# Patient Record
Sex: FEMALE | Race: WHITE | Employment: PART TIME | URBAN - METROPOLITAN AREA
[De-identification: names, ages, dates, MRNs, and addresses within clinical notes are randomized per-mention and may not be internally consistent; named-entity substitution may affect disease eponyms.]

---

## 2024-07-01 ENCOUNTER — OFFICE VISIT (OUTPATIENT)
Age: 29
End: 2024-07-01

## 2024-07-01 VITALS
RESPIRATION RATE: 18 BRPM | SYSTOLIC BLOOD PRESSURE: 118 MMHG | OXYGEN SATURATION: 98 % | HEART RATE: 85 BPM | WEIGHT: 144 LBS | DIASTOLIC BLOOD PRESSURE: 73 MMHG | TEMPERATURE: 98.4 F

## 2024-07-01 DIAGNOSIS — R21 RASH OF GENITAL AREA: ICD-10-CM

## 2024-07-01 DIAGNOSIS — B99.9 INFECTIOUS FOLLICULITIS: Primary | ICD-10-CM

## 2024-07-01 DIAGNOSIS — L73.8 INFECTIOUS FOLLICULITIS: Primary | ICD-10-CM

## 2024-07-01 RX ORDER — TRAZODONE HYDROCHLORIDE 50 MG/1
TABLET ORAL
COMMUNITY
Start: 2022-08-11

## 2024-07-01 RX ORDER — LAMOTRIGINE 150 MG/1
300 TABLET ORAL DAILY
COMMUNITY
Start: 2024-04-26

## 2024-07-01 RX ORDER — CEPHALEXIN 500 MG/1
500 CAPSULE ORAL 4 TIMES DAILY
Qty: 28 CAPSULE | Refills: 0 | Status: SHIPPED | OUTPATIENT
Start: 2024-07-01 | End: 2024-07-08

## 2024-07-01 RX ORDER — ARIPIPRAZOLE 5 MG/1
5 TABLET ORAL DAILY
COMMUNITY
Start: 2024-04-26

## 2024-07-01 RX ORDER — BUPROPION HYDROCHLORIDE 300 MG/1
300 TABLET ORAL EVERY MORNING
COMMUNITY
Start: 2022-06-16

## 2024-07-01 NOTE — PATIENT INSTRUCTIONS
Patient was seen today for ongoing vaginal discomfort and rash  Will send vaginal swab to test for BV, yeast and STIs  We will contact you in 2 to 3 days with results  The rash to the vaginal area has already been tested for herpes and was negative  I suspect an infectious folliculitis, molluscum is also a differential  I would like to treat you with Keflex 4 times daily for 7 days  If symptoms do not improve I do recommend following up with a dermatologist, referral provided

## 2024-07-01 NOTE — PROGRESS NOTES
and dry.   Neurological:      General: No focal deficit present.      Mental Status: She is alert and oriented to person, place, and time.               An electronic signature was used to authenticate this note.    RICARDO Juarez NP

## 2024-07-04 LAB
A VAGINAE DNA VAG QL NAA+PROBE: NORMAL SCORE
BVAB2 DNA VAG QL NAA+PROBE: NORMAL SCORE
C ALBICANS DNA VAG QL NAA+PROBE: NEGATIVE
C GLABRATA DNA VAG QL NAA+PROBE: NEGATIVE
C TRACH RRNA SPEC QL NAA+PROBE: NEGATIVE
MEGA1 DNA VAG QL NAA+PROBE: NORMAL SCORE
N GONORRHOEA RRNA SPEC QL NAA+PROBE: NEGATIVE
SPECIMEN SOURCE: NORMAL
T VAGINALIS RRNA SPEC QL NAA+PROBE: NEGATIVE

## 2024-07-09 ENCOUNTER — OFFICE VISIT (OUTPATIENT)
Age: 29
End: 2024-07-09

## 2024-07-09 VITALS
TEMPERATURE: 98.1 F | DIASTOLIC BLOOD PRESSURE: 72 MMHG | HEART RATE: 70 BPM | OXYGEN SATURATION: 100 % | RESPIRATION RATE: 18 BRPM | SYSTOLIC BLOOD PRESSURE: 109 MMHG | WEIGHT: 144 LBS

## 2024-07-09 DIAGNOSIS — R05.1 ACUTE COUGH: ICD-10-CM

## 2024-07-09 DIAGNOSIS — J06.9 VIRAL URI: Primary | ICD-10-CM

## 2024-07-09 DIAGNOSIS — R11.0 NAUSEA: ICD-10-CM

## 2024-07-09 LAB
INFLUENZA A ANTIGEN, POC: NEGATIVE
INFLUENZA B ANTIGEN, POC: NEGATIVE
Lab: NORMAL
PERFORMING INSTRUMENT: NORMAL
QC PASS/FAIL: NORMAL
SARS-COV-2, POC: NORMAL
STREP PYOGENES DNA, POC: NEGATIVE
VALID INTERNAL CONTROL, POC: YES

## 2024-07-09 RX ORDER — NICOTINE 14MG/24HR
1 PATCH, TRANSDERMAL 24 HOURS TRANSDERMAL 2 TIMES DAILY
Qty: 28 CAPSULE | Refills: 0 | Status: SHIPPED | OUTPATIENT
Start: 2024-07-09 | End: 2024-07-23

## 2024-07-09 ASSESSMENT — ENCOUNTER SYMPTOMS: COUGH: 1

## 2024-07-09 NOTE — PROGRESS NOTES
Elvira Nunes (:  1995) is a 28 y.o. female,Established patient, here for evaluation of the following chief complaint(s):  Cough (Wants covid, flu and strep testing. Sx consist of sore throat and fatigue. Sx 4 days.)      Assessment & Plan :  Visit Diagnoses and Associated Orders       Viral URI    -  Primary         Acute cough        POCT COVID-19, Antigen [GNL513 Custom]      AMB POC STREP GO A DIRECT, DNA PROBE [93192 CPT(R)]      POCT Influenza A/B Antigen [08310 Custom]           Nausea                   Patient is seen today for symptoms which are consistent with a viral upper respiratory tract infection  COVID, strep and flu are all negative today  Symptoms only ongoing for about 5 days now, it is a little bit early to be testing for mono  Your vital signs are all stable, physical exam is benign, I am encouraged that symptoms seem somewhat improved today  I would like for you to continue to treat symptomatically  Tylenol/ibuprofen as needed for fevers chills aches and pains  Mucinex-DM as needed for congestion and cough  Lots of fluids, plenty of rest  Follow-up if symptoms persist or worsen       Subjective :    Cough         28 y.o. female presents with symptoms of fatigue and sore throat for the past 4 to 5 days.  She denies any fevers or chills.  Denies any ear pain.  Reports mild nasal congestion and postnasal drip with some mild coughing.  Denies significant sputum production, no shortness of breath or wheezing.  No chest or abdominal pain.  She is currently taking some antibiotics, cephalexin for an infectious folliculitis.  Notes that this is making her a little nauseous and upsetting her stomach at times.  No vomiting, tolerating food and fluids well.  She would like to be tested for COVID strep and flu today.  She denies any known exposures to these illnesses.  She does feel like symptoms are somewhat improved today over how she has felt the past few days.         Vitals:    24

## 2024-07-09 NOTE — PATIENT INSTRUCTIONS
Patient is seen today for symptoms which are consistent with a viral upper respiratory tract infection  COVID, strep and flu are all negative today  Symptoms only ongoing for about 5 days now, it is a little bit early to be testing for mono  Your vital signs are all stable, physical exam is benign, I am encouraged that symptoms seem somewhat improved today  I would like for you to continue to treat symptomatically  Tylenol/ibuprofen as needed for fevers chills aches and pains  Mucinex-DM as needed for congestion and cough  Lots of fluids, plenty of rest  Follow-up if symptoms persist or worsen

## 2024-07-30 ENCOUNTER — OFFICE VISIT (OUTPATIENT)
Age: 29
End: 2024-07-30

## 2024-07-30 VITALS
DIASTOLIC BLOOD PRESSURE: 78 MMHG | RESPIRATION RATE: 17 BRPM | HEART RATE: 68 BPM | SYSTOLIC BLOOD PRESSURE: 122 MMHG | OXYGEN SATURATION: 98 % | TEMPERATURE: 98.8 F | WEIGHT: 139 LBS

## 2024-07-30 DIAGNOSIS — N39.0 ACUTE UTI: Primary | ICD-10-CM

## 2024-07-30 DIAGNOSIS — R35.0 URINARY FREQUENCY: ICD-10-CM

## 2024-07-30 LAB
BILIRUBIN, URINE, POC: NEGATIVE
BLOOD URINE, POC: ABNORMAL
GLUCOSE URINE, POC: NEGATIVE
KETONES, URINE, POC: NEGATIVE
LEUKOCYTE ESTERASE, URINE, POC: NEGATIVE
NITRITE, URINE, POC: NEGATIVE
PH, URINE, POC: 7 (ref 4.6–8)
PROTEIN,URINE, POC: NEGATIVE
SPECIFIC GRAVITY, URINE, POC: 1.01 (ref 1–1.03)
URINALYSIS CLARITY, POC: CLEAR
URINALYSIS COLOR, POC: ABNORMAL
UROBILINOGEN, POC: ABNORMAL

## 2024-07-30 RX ORDER — NICOTINE 14MG/24HR
1 PATCH, TRANSDERMAL 24 HOURS TRANSDERMAL DAILY
Qty: 30 CAPSULE | Refills: 0 | Status: SHIPPED | OUTPATIENT
Start: 2024-07-30 | End: 2024-08-29

## 2024-07-30 RX ORDER — NITROFURANTOIN 25; 75 MG/1; MG/1
100 CAPSULE ORAL 2 TIMES DAILY
Qty: 14 CAPSULE | Refills: 0 | Status: SHIPPED | OUTPATIENT
Start: 2024-07-30 | End: 2024-08-06

## 2024-07-30 NOTE — PROGRESS NOTES
Sitting   Cuff Size: Large Adult   Pulse: 68   Resp: 17   Temp: 98.8 °F (37.1 °C)   SpO2: 98%   Weight: 63 kg (139 lb)       Results for orders placed or performed in visit on 07/30/24   AMB POC URINALYSIS DIP STICK AUTO W/O MICRO   Result Value Ref Range    Color, Urine, POC Light Yellow     Clarity, Urine, POC Clear     Glucose, Urine, POC Negative     Bilirubin, Urine, POC Negative     Ketones, Urine, POC Negative     Specific Gravity, Urine, POC 1.015 1.001 - 1.035    Blood, Urine, POC Trace-lysed     pH, Urine, POC 7.0 4.6 - 8.0    Protein, Urine, POC Negative     Urobilinogen, POC 0.2 mg/dL     Nitrite, Urine, POC Negative     Leukocyte Esterase, Urine, POC Negative           Objective   Physical Exam  Vitals and nursing note reviewed.   Constitutional:       General: She is not in acute distress.     Appearance: Normal appearance. She is not ill-appearing.   HENT:      Head: Normocephalic and atraumatic.   Cardiovascular:      Rate and Rhythm: Normal rate and regular rhythm.      Pulses: Normal pulses.      Heart sounds: Normal heart sounds. No murmur heard.     No friction rub. No gallop.   Pulmonary:      Effort: Pulmonary effort is normal. No respiratory distress.      Breath sounds: Normal breath sounds. No wheezing, rhonchi or rales.   Abdominal:      General: Abdomen is flat. There is no distension.      Palpations: Abdomen is soft. There is no mass.      Tenderness: There is no abdominal tenderness. There is no right CVA tenderness, left CVA tenderness or guarding.   Skin:     General: Skin is warm and dry.   Neurological:      General: No focal deficit present.      Mental Status: She is alert and oriented to person, place, and time.               An electronic signature was used to authenticate this note.    RICARDO Juarez NP

## 2024-08-01 LAB
BACTERIA SPEC CULT: NORMAL
SERVICE CMNT-IMP: NORMAL

## 2024-08-20 SDOH — HEALTH STABILITY: PHYSICAL HEALTH: ON AVERAGE, HOW MANY DAYS PER WEEK DO YOU ENGAGE IN MODERATE TO STRENUOUS EXERCISE (LIKE A BRISK WALK)?: 2 DAYS

## 2024-08-20 SDOH — HEALTH STABILITY: PHYSICAL HEALTH: ON AVERAGE, HOW MANY MINUTES DO YOU ENGAGE IN EXERCISE AT THIS LEVEL?: 60 MIN

## 2024-08-22 ENCOUNTER — OFFICE VISIT (OUTPATIENT)
Facility: CLINIC | Age: 29
End: 2024-08-22
Payer: MEDICAID

## 2024-08-22 VITALS
HEART RATE: 86 BPM | SYSTOLIC BLOOD PRESSURE: 119 MMHG | RESPIRATION RATE: 16 BRPM | HEIGHT: 67 IN | BODY MASS INDEX: 21.66 KG/M2 | WEIGHT: 138 LBS | DIASTOLIC BLOOD PRESSURE: 82 MMHG | OXYGEN SATURATION: 99 %

## 2024-08-22 DIAGNOSIS — Z11.59 NEED FOR HEPATITIS C SCREENING TEST: ICD-10-CM

## 2024-08-22 DIAGNOSIS — Z76.89 ENCOUNTER TO ESTABLISH CARE: Primary | ICD-10-CM

## 2024-08-22 DIAGNOSIS — Z83.49 FAMILY HISTORY OF MTHFR DEFICIENCY: ICD-10-CM

## 2024-08-22 DIAGNOSIS — B94.8 POST-LYME DISEASE SYNDROME: ICD-10-CM

## 2024-08-22 DIAGNOSIS — R23.8 OTHER SKIN CHANGES: ICD-10-CM

## 2024-08-22 DIAGNOSIS — M25.50 MULTIPLE JOINT PAIN: ICD-10-CM

## 2024-08-22 DIAGNOSIS — R53.82 CHRONIC FATIGUE: ICD-10-CM

## 2024-08-22 DIAGNOSIS — E55.9 VITAMIN D DEFICIENCY: ICD-10-CM

## 2024-08-22 DIAGNOSIS — Z82.49 FAMILY HISTORY OF HEART DISEASE: ICD-10-CM

## 2024-08-22 DIAGNOSIS — Z11.4 ENCOUNTER FOR SCREENING FOR HIV: ICD-10-CM

## 2024-08-22 LAB
APPEARANCE UR: CLEAR
BILIRUB UR QL: NEGATIVE
COLOR UR: NORMAL
GLUCOSE UR STRIP.AUTO-MCNC: NEGATIVE MG/DL
HGB UR QL STRIP: NEGATIVE
KETONES UR QL STRIP.AUTO: NEGATIVE MG/DL
LEUKOCYTE ESTERASE UR QL STRIP.AUTO: NEGATIVE
NITRITE UR QL STRIP.AUTO: NEGATIVE
PH UR STRIP: 6 (ref 5–8)
PROT UR STRIP-MCNC: NEGATIVE MG/DL
SP GR UR REFRACTOMETRY: 1.01 (ref 1–1.03)
UROBILINOGEN UR QL STRIP.AUTO: 1 EU/DL (ref 0.2–1)

## 2024-08-22 PROCEDURE — 99204 OFFICE O/P NEW MOD 45 MIN: CPT

## 2024-08-22 SDOH — ECONOMIC STABILITY: FOOD INSECURITY: WITHIN THE PAST 12 MONTHS, YOU WORRIED THAT YOUR FOOD WOULD RUN OUT BEFORE YOU GOT MONEY TO BUY MORE.: NEVER TRUE

## 2024-08-22 SDOH — ECONOMIC STABILITY: FOOD INSECURITY: WITHIN THE PAST 12 MONTHS, THE FOOD YOU BOUGHT JUST DIDN'T LAST AND YOU DIDN'T HAVE MONEY TO GET MORE.: NEVER TRUE

## 2024-08-22 SDOH — ECONOMIC STABILITY: INCOME INSECURITY: HOW HARD IS IT FOR YOU TO PAY FOR THE VERY BASICS LIKE FOOD, HOUSING, MEDICAL CARE, AND HEATING?: NOT HARD AT ALL

## 2024-08-22 ASSESSMENT — PATIENT HEALTH QUESTIONNAIRE - PHQ9
SUM OF ALL RESPONSES TO PHQ QUESTIONS 1-9: 0
SUM OF ALL RESPONSES TO PHQ9 QUESTIONS 1 & 2: 0
SUM OF ALL RESPONSES TO PHQ QUESTIONS 1-9: 0
SUM OF ALL RESPONSES TO PHQ QUESTIONS 1-9: 0
2. FEELING DOWN, DEPRESSED OR HOPELESS: NOT AT ALL
1. LITTLE INTEREST OR PLEASURE IN DOING THINGS: NOT AT ALL
SUM OF ALL RESPONSES TO PHQ QUESTIONS 1-9: 0

## 2024-08-23 ENCOUNTER — TELEPHONE (OUTPATIENT)
Age: 29
End: 2024-08-23

## 2024-08-23 LAB
25(OH)D3 SERPL-MCNC: 38.8 NG/ML (ref 30–100)
ALBUMIN SERPL-MCNC: 4.4 G/DL (ref 3.5–5)
ALBUMIN/GLOB SERPL: 1.5 (ref 1.1–2.2)
ALP SERPL-CCNC: 77 U/L (ref 45–117)
ALT SERPL-CCNC: 28 U/L (ref 12–78)
ANION GAP SERPL CALC-SCNC: 4 MMOL/L (ref 5–15)
AST SERPL-CCNC: 16 U/L (ref 15–37)
BASOPHILS # BLD: 0 K/UL (ref 0–0.1)
BASOPHILS NFR BLD: 1 % (ref 0–1)
BILIRUB SERPL-MCNC: 0.2 MG/DL (ref 0.2–1)
BUN SERPL-MCNC: 7 MG/DL (ref 6–20)
BUN/CREAT SERPL: 7 (ref 12–20)
CALCIUM SERPL-MCNC: 9.6 MG/DL (ref 8.5–10.1)
CHLORIDE SERPL-SCNC: 105 MMOL/L (ref 97–108)
CHOLEST SERPL-MCNC: 233 MG/DL
CO2 SERPL-SCNC: 30 MMOL/L (ref 21–32)
CREAT SERPL-MCNC: 0.98 MG/DL (ref 0.55–1.02)
DIFFERENTIAL METHOD BLD: NORMAL
EOSINOPHIL # BLD: 0.1 K/UL (ref 0–0.4)
EOSINOPHIL NFR BLD: 2 % (ref 0–7)
ERYTHROCYTE [DISTWIDTH] IN BLOOD BY AUTOMATED COUNT: 12.1 % (ref 11.5–14.5)
ERYTHROCYTE [SEDIMENTATION RATE] IN BLOOD: 2 MM/HR (ref 0–20)
GLOBULIN SER CALC-MCNC: 3 G/DL (ref 2–4)
GLUCOSE SERPL-MCNC: 92 MG/DL (ref 65–100)
HCT VFR BLD AUTO: 43.6 % (ref 35–47)
HCV AB SER IA-ACNC: 0.06 INDEX
HCV AB SERPL QL IA: NONREACTIVE
HDLC SERPL-MCNC: 42 MG/DL
HDLC SERPL: 5.5 (ref 0–5)
HGB BLD-MCNC: 14.5 G/DL (ref 11.5–16)
HIV 1+2 AB+HIV1 P24 AG SERPL QL IA: NONREACTIVE
HIV 1/2 RESULT COMMENT: NORMAL
IMM GRANULOCYTES # BLD AUTO: 0 K/UL (ref 0–0.04)
IMM GRANULOCYTES NFR BLD AUTO: 0 % (ref 0–0.5)
LDLC SERPL CALC-MCNC: 162.6 MG/DL (ref 0–100)
LYMPHOCYTES # BLD: 1.2 K/UL (ref 0.8–3.5)
LYMPHOCYTES NFR BLD: 27 % (ref 12–49)
MCH RBC QN AUTO: 31.2 PG (ref 26–34)
MCHC RBC AUTO-ENTMCNC: 33.3 G/DL (ref 30–36.5)
MCV RBC AUTO: 93.8 FL (ref 80–99)
MONOCYTES # BLD: 0.4 K/UL (ref 0–1)
MONOCYTES NFR BLD: 9 % (ref 5–13)
NEUTS SEG # BLD: 2.7 K/UL (ref 1.8–8)
NEUTS SEG NFR BLD: 61 % (ref 32–75)
NRBC # BLD: 0 K/UL (ref 0–0.01)
NRBC BLD-RTO: 0 PER 100 WBC
PLATELET # BLD AUTO: 196 K/UL (ref 150–400)
PMV BLD AUTO: 12.7 FL (ref 8.9–12.9)
POTASSIUM SERPL-SCNC: 4.3 MMOL/L (ref 3.5–5.1)
PROT SERPL-MCNC: 7.4 G/DL (ref 6.4–8.2)
RBC # BLD AUTO: 4.65 M/UL (ref 3.8–5.2)
SODIUM SERPL-SCNC: 139 MMOL/L (ref 136–145)
TRIGL SERPL-MCNC: 142 MG/DL
TSH SERPL DL<=0.05 MIU/L-ACNC: 4.2 UIU/ML (ref 0.45–4.5)
VLDLC SERPL CALC-MCNC: 28.4 MG/DL
WBC # BLD AUTO: 4.5 K/UL (ref 3.6–11)

## 2024-08-23 NOTE — TELEPHONE ENCOUNTER
----- Message from RICARDO Mcnally NP sent at 8/23/2024  8:52 AM EDT -----  I'm going to wait for labs to come back before I give you an answer.  ----- Message -----  From: William Ely  Sent: 8/22/2024   1:55 PM EDT  To: RICARDO Altamirano NP    PT referred and requesting appt

## 2024-08-23 NOTE — RESULT ENCOUNTER NOTE
I have reviewed all lab results which are normal or stable mostly except for lipids. Please advise patient to start mediterranean diet, exercise at least 150 mins per week and follow up as scheduled. Thank you!

## 2024-08-23 NOTE — TELEPHONE ENCOUNTER
Called PT to inform that NP is waiting on lab results before scheduling. LVM requesting call back and provided office number

## 2024-08-24 LAB
CENTROMERE B AB SER-ACNC: <0.2 AI (ref 0–0.9)
CHROMATIN AB SERPL-ACNC: <0.2 AI (ref 0–0.9)
DSDNA AB SER-ACNC: 1 IU/ML (ref 0–9)
ENA JO1 AB SER-ACNC: <0.2 AI (ref 0–0.9)
ENA RNP AB SER-ACNC: 0.3 AI (ref 0–0.9)
ENA SCL70 AB SER-ACNC: <0.2 AI (ref 0–0.9)
ENA SM AB SER-ACNC: <0.2 AI (ref 0–0.9)
ENA SS-A AB SER-ACNC: <0.2 AI (ref 0–0.9)
ENA SS-B AB SER-ACNC: <0.2 AI (ref 0–0.9)
Lab: NORMAL

## 2024-08-27 ENCOUNTER — TELEPHONE (OUTPATIENT)
Age: 29
End: 2024-08-27

## 2024-08-27 LAB
CCP IGA+IGG SERPL IA-ACNC: 6 UNITS (ref 0–19)
RHEUMATOID FACT SERPL-ACNC: <10 IU/ML

## 2024-08-27 NOTE — TELEPHONE ENCOUNTER
----- Message from RICARDO Mcnally NP sent at 8/27/2024  9:04 AM EDT -----  Thank you for contacting [facility]. We appreciate your trust in us for providing care. To ensure access for appropriate referrals, best utilize limited resources, and ensure urgent issues can be seen promptly, the [facility] is reviewing and triaging referrals. Currently, we are not able to accept referrals for non-inflammatory conditions (i.e. osteoarthritis, fibromyalgia, hypermobility syndromes/EDS, osteoporosis, etc). For initial evaluation of joint pain without inflammatory components, evaluation by PCP, orthopedics, or physical medicine and rehab is recommended. If there is concern for an autoimmune, inflammatory, or connective tissue disease, please provide additional information/documentation to clarify with our office.  ----- Message -----  From: William Ely  Sent: 8/22/2024   1:55 PM EDT  To: RICARDO Altamirano NP    PT referred and requesting appt

## 2024-08-27 NOTE — TELEPHONE ENCOUNTER
Called to inform per ABBEY Coles Currently, we are not able to accept referrals for non-inflammatory conditions (i.e. osteoarthritis, fibromyalgia, hypermobility syndromes/EDS, osteoporosis, etc). LVM requesting a call back and provided office number

## 2024-08-27 NOTE — TELEPHONE ENCOUNTER
PT returned call. Informed per ABBEY Coles Currently, we are not able to accept referrals for non-inflammatory conditions (i.e. osteoarthritis, fibromyalgia, hypermobility syndromes/EDS, osteoporosis, etc). PT stated she understood. Advised to f/up with PCP.

## 2024-08-30 LAB
IMP & REVIEW OF LAB RESULTS: NORMAL
MTHFR GENE MUT ANL BLD/T: NORMAL

## 2024-09-02 PROBLEM — E55.9 VITAMIN D DEFICIENCY: Status: ACTIVE | Noted: 2024-09-02

## 2024-09-02 PROBLEM — Z82.49 FAMILY HISTORY OF HEART DISEASE: Status: ACTIVE | Noted: 2024-09-02

## 2024-09-02 PROBLEM — Z83.49 FAMILY HISTORY OF MTHFR DEFICIENCY: Status: ACTIVE | Noted: 2024-09-02

## 2024-09-02 PROBLEM — R53.82 CHRONIC FATIGUE: Status: ACTIVE | Noted: 2024-09-02

## 2024-09-02 PROBLEM — M25.50 MULTIPLE JOINT PAIN: Status: ACTIVE | Noted: 2024-09-02

## 2024-09-02 PROBLEM — B94.8 POST-LYME DISEASE SYNDROME: Status: ACTIVE | Noted: 2024-09-02

## 2024-09-02 NOTE — PROGRESS NOTES
Chief Complaint   Patient presents with    Establish Care     1. Have you been to the ER, urgent care clinic since your last visit?  Hospitalized since your last visit?No    2. Have you seen or consulted any other health care providers outside of the [facility] since your last visit?  Include any pap smears or colon screening. No    
 Requested Specialty:   Dermatology     Number of Visits Requested:   1     Follow-up and Dispositions    Return in about 1 week (around 8/29/2024) for VV:Lab Review, OR SOONER IF NEEDED.       I have reviewed with the patient details of the assessment and plan and all questions were answered. Relevent patient education was performed.The most recent lab findings were reviewed with the patient.    An After Visit Summary was printed and given to the patient.      Signed By: RICARDO Ruff NP     August 22, 2024         -----------------------------------------------------------------------------------------------------------------------------------------      The patient (or guardian, if applicable) and other individuals in attendance with the patient were advised that Artificial Intelligence will be utilized during this visit to record and process the conversation to generate a clinical note. The patient (or guardian, if applicable) and other individuals in attendance at the appointment consented to the use of AI, including the recording.

## 2024-09-04 ENCOUNTER — OFFICE VISIT (OUTPATIENT)
Facility: CLINIC | Age: 29
End: 2024-09-04
Payer: MEDICAID

## 2024-09-04 VITALS
RESPIRATION RATE: 16 BRPM | DIASTOLIC BLOOD PRESSURE: 77 MMHG | OXYGEN SATURATION: 98 % | SYSTOLIC BLOOD PRESSURE: 121 MMHG | HEART RATE: 81 BPM | HEIGHT: 67 IN | WEIGHT: 142 LBS | BODY MASS INDEX: 22.29 KG/M2

## 2024-09-04 DIAGNOSIS — B94.8 POST-LYME DISEASE SYNDROME: ICD-10-CM

## 2024-09-04 DIAGNOSIS — E78.00 HYPERCHOLESTEROLEMIA: ICD-10-CM

## 2024-09-04 DIAGNOSIS — M25.50 MULTIPLE JOINT PAIN: ICD-10-CM

## 2024-09-04 DIAGNOSIS — Z71.2 ENCOUNTER TO DISCUSS TEST RESULTS: Primary | ICD-10-CM

## 2024-09-04 PROBLEM — D24.1 FIBROADENOMA OF BOTH BREASTS: Status: ACTIVE | Noted: 2020-02-02

## 2024-09-04 PROBLEM — D24.2 FIBROADENOMA OF BOTH BREASTS: Status: ACTIVE | Noted: 2020-02-02

## 2024-09-04 PROCEDURE — 99212 OFFICE O/P EST SF 10 MIN: CPT

## 2024-09-04 ASSESSMENT — PATIENT HEALTH QUESTIONNAIRE - PHQ9
2. FEELING DOWN, DEPRESSED OR HOPELESS: NOT AT ALL
1. LITTLE INTEREST OR PLEASURE IN DOING THINGS: NOT AT ALL
SUM OF ALL RESPONSES TO PHQ9 QUESTIONS 1 & 2: 0
SUM OF ALL RESPONSES TO PHQ QUESTIONS 1-9: 0

## 2024-09-04 NOTE — PROGRESS NOTES
Chief Complaint   Patient presents with    Discuss Labs     1. Have you been to the ER, urgent care clinic since your last visit?  Hospitalized since your last visit?No    2. Have you seen or consulted any other health care providers outside of the [facility] since your last visit?  Include any pap smears or colon screening. No    
palpitations, lower extremity edema  GI:   negative for nausea, vomiting, diarrhea, abdominal pain,melena  Endo:               negative for polyuria,polydipsia,polyphagia,heat intolerance  Genitourinary: negative for frequency, dysuria and hematuria  Integument:  negative for rash and pruritus  Hematologic:  negative for easy bruising and gum/nose bleeding  Musculoskel: negative for myalgias, back pain, muscle weakness  Neurological:  negative for headaches, dizziness, vertigo, memory problems and gait   Behavl/Psych: negative for feelings of anxiety, depression, mood changes    Past Medical History:   Diagnosis Date    Anxiety 2017    Chronic back pain 2017    Depression 2017    Headache 2008    Lyme disease      No past surgical history on file.  Social History     Socioeconomic History    Marital status:      Spouse name: None    Number of children: None    Years of education: None    Highest education level: None   Tobacco Use    Smoking status: Never    Smokeless tobacco: Never   Substance and Sexual Activity    Alcohol use: Not Currently    Drug use: Not Currently    Sexual activity: Yes     Partners: Female, Male     Social Determinants of Health     Financial Resource Strain: Low Risk  (8/22/2024)    Overall Financial Resource Strain (CARDIA)     Difficulty of Paying Living Expenses: Not hard at all   Food Insecurity: No Food Insecurity (8/22/2024)    Hunger Vital Sign     Worried About Running Out of Food in the Last Year: Never true     Ran Out of Food in the Last Year: Never true   Transportation Needs: Unknown (8/22/2024)    PRAPARE - Transportation     Lack of Transportation (Non-Medical): No   Physical Activity: Insufficiently Active (8/20/2024)    Exercise Vital Sign     Days of Exercise per Week: 2 days     Minutes of Exercise per Session: 60 min   Housing Stability: Unknown (8/22/2024)    Housing Stability Vital Sign     Homeless in the Last Year: No     Family History   Problem Relation Age

## 2024-09-05 NOTE — PATIENT INSTRUCTIONS
Patient will be referred to pain specialist NP Ivis Mon.     Advised to take ibuprofen and tylenol otc as needed prn as directed. Follow with physical therapy as the one you were previously attending. Notify if you need referrals.

## 2024-11-01 ENCOUNTER — OFFICE VISIT (OUTPATIENT)
Age: 29
End: 2024-11-01

## 2024-11-01 VITALS
HEART RATE: 67 BPM | SYSTOLIC BLOOD PRESSURE: 116 MMHG | OXYGEN SATURATION: 100 % | WEIGHT: 134 LBS | TEMPERATURE: 98.1 F | BODY MASS INDEX: 20.99 KG/M2 | DIASTOLIC BLOOD PRESSURE: 70 MMHG

## 2024-11-01 DIAGNOSIS — R11.2 NAUSEA AND VOMITING, UNSPECIFIED VOMITING TYPE: ICD-10-CM

## 2024-11-01 DIAGNOSIS — A08.4 VIRAL GASTROENTERITIS: Primary | ICD-10-CM

## 2024-11-01 DIAGNOSIS — R11.0 NAUSEA: ICD-10-CM

## 2024-11-01 LAB
Lab: NORMAL
PERFORMING INSTRUMENT: NORMAL
QC PASS/FAIL: NORMAL
SARS-COV-2, POC: NORMAL

## 2024-11-01 RX ORDER — ONDANSETRON 4 MG/1
4 TABLET, ORALLY DISINTEGRATING ORAL ONCE
Status: COMPLETED | OUTPATIENT
Start: 2024-11-01 | End: 2024-11-01

## 2024-11-01 RX ORDER — ONDANSETRON 4 MG/1
4 TABLET, ORALLY DISINTEGRATING ORAL 3 TIMES DAILY PRN
Qty: 21 TABLET | Refills: 0 | Status: SHIPPED | OUTPATIENT
Start: 2024-11-01

## 2024-11-01 RX ADMIN — ONDANSETRON 4 MG: 4 TABLET, ORALLY DISINTEGRATING ORAL at 16:20

## 2024-11-01 ASSESSMENT — ENCOUNTER SYMPTOMS: VOMITING: 1

## 2024-11-01 NOTE — PATIENT INSTRUCTIONS
Patient was seen today for evaluation of nausea and vomiting  Negative COVID test in clinic today  Her vital signs are stable, physical exam is benign, no abdominal tenderness to suggest more emergent pathology  Unable to provide urine sample in clinic today, patient would like to hold off on doing this at this time  Zofran was administered in clinic today which did seem to help the patient's nausea  I have prescribed Zofran for home use, please take up to 3 times daily as needed for nausea and vomiting  After administering Zofran, please wait 5 to 10 minutes before attempting to eat or drink, push fluids  Follow a brat diet for the next several days: Bananas, rice, applesauce and toast  Please monitor symptoms carefully, if acutely worsening, or if you develop severe abdominal pain, fevers/chills, or other acutely worsening symptoms, please go immediately to the emergency department

## 2024-11-01 NOTE — PROGRESS NOTES
Elvira Nunes (:  1995) is a 28 y.o. female,Established patient, here for evaluation of the following chief complaint(s):  Vomiting (Vomiting, nausea, diarrhea, sore throat and congestion/ X  5 days )      Assessment & Plan :  Visit Diagnoses and Associated Orders       Viral gastroenteritis    -  Primary         Nausea        ondansetron (ZOFRAN-ODT) disintegrating tablet 4 mg [67301]      POCT COVID-19, Antigen [SBL832 Custom]           Nausea and vomiting, unspecified vomiting type        ondansetron (ZOFRAN-ODT) 4 MG disintegrating tablet [54255]                   Patient was seen today for evaluation of nausea and vomiting  Negative COVID test in clinic today  Her vital signs are stable, physical exam is benign, no abdominal tenderness to suggest more emergent pathology  Unable to provide urine sample in clinic today, patient would like to hold off on doing this at this time  Zofran was administered in clinic today which did seem to help the patient's nausea  I have prescribed Zofran for home use, please take up to 3 times daily as needed for nausea and vomiting  After administering Zofran, please wait 5 to 10 minutes before attempting to eat or drink, push fluids  Follow a brat diet for the next several days: Bananas, rice, applesauce and toast  Please monitor symptoms carefully, if acutely worsening, or if you develop severe abdominal pain, fevers/chills, or other acutely worsening symptoms, please go immediately to the emergency department       Subjective :    Vomiting          28 y.o. female presents with symptoms of nausea and vomiting which has persisted for the past 4 days.  She states that symptoms have waxed and waned in intensity but are severe today.  She reports vomiting approximately 4 times since arriving.  She denies any chance of pregnancy.  Denies any significant abdominal pain, she does report some occasional generalized abdominal cramping.  Reports some occasional loose stools

## 2024-12-17 ENCOUNTER — OFFICE VISIT (OUTPATIENT)
Facility: CLINIC | Age: 29
End: 2024-12-17
Payer: MEDICAID

## 2024-12-17 VITALS
DIASTOLIC BLOOD PRESSURE: 72 MMHG | SYSTOLIC BLOOD PRESSURE: 112 MMHG | BODY MASS INDEX: 21.03 KG/M2 | WEIGHT: 134 LBS | OXYGEN SATURATION: 98 % | RESPIRATION RATE: 16 BRPM | HEIGHT: 67 IN | HEART RATE: 79 BPM

## 2024-12-17 DIAGNOSIS — E78.00 HYPERCHOLESTEROLEMIA: ICD-10-CM

## 2024-12-17 DIAGNOSIS — U09.9 POST-COVID CHRONIC FATIGUE: Primary | ICD-10-CM

## 2024-12-17 DIAGNOSIS — G93.32 POST-COVID CHRONIC FATIGUE: Primary | ICD-10-CM

## 2024-12-17 PROBLEM — R25.2 CRAMP AND SPASM: Status: ACTIVE | Noted: 2024-02-28

## 2024-12-17 PROBLEM — A69.20 LYME DISEASE, UNSPECIFIED: Status: ACTIVE | Noted: 2024-02-28

## 2024-12-17 PROBLEM — K59.00 CONSTIPATION: Status: ACTIVE | Noted: 2024-11-06

## 2024-12-17 PROCEDURE — 99212 OFFICE O/P EST SF 10 MIN: CPT

## 2024-12-17 NOTE — PROGRESS NOTES
Elvira Nunes is a 29 y.o. 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    She has been experiencing severe fatigue since her last COVID-19 infection in July 2024, which has significantly limited her physical activity. She also reports persistent sore throat, congestion, and sleep disturbances. She is considering seeking evaluation at the [facility] COVID-19 clinic to determine if her symptoms are related to long COVID-19 or her pre-existing Lyme disease.    She has a history of Lyme disease and has been experiencing generalized body pain. Over the past month, she has developed pain in her hands, back, neck, and joints, which she attributes to her Lyme disease. She finds that cold weather exacerbates her symptoms. She has not yet consulted with a rheumatologist. Cold weather exacerbates her symptoms. She takes hot baths approximately four times a week, which provides some relief. She is currently taking vitamin D, B12, adrenal support supplements, fish oil, and magnesium.    She has been experiencing ear pain and was evaluated by an ENT specialist last week. The ENT specialist attributed her symptoms to bruxism, which has resulted in jaw swelling. She has a long-standing habit of teeth grinding and used to wear a mouthguard, but it no longer fits due to her braces. She was informed that wearing a mouthguard at night could help, but she is unable to do so due to her braces. She was previously informed that she had fluid in her ear, but this was ruled out by the ENT specialist. Her hearing was found to be normal. She has been using Flonase for her ear symptoms, but it has not been effective. She occasionally takes Mucinex or Sudafed, which she finds helpful.    She has a

## 2024-12-17 NOTE — PROGRESS NOTES
Chief Complaint   Patient presents with    Other     Referral to get tested for lung covid      1. Have you been to the ER, urgent care clinic since your last visit?  Hospitalized since your last visit?No    2. Have you seen or consulted any other health care providers outside of the [facility] since your last visit?  Include any pap smears or colon screening. No